# Patient Record
Sex: MALE | Race: WHITE | Employment: FULL TIME | URBAN - METROPOLITAN AREA
[De-identification: names, ages, dates, MRNs, and addresses within clinical notes are randomized per-mention and may not be internally consistent; named-entity substitution may affect disease eponyms.]

---

## 2020-02-27 ENCOUNTER — OFFICE VISIT (OUTPATIENT)
Dept: ORTHOPEDIC SURGERY | Age: 55
End: 2020-02-27
Payer: COMMERCIAL

## 2020-02-27 VITALS
SYSTOLIC BLOOD PRESSURE: 151 MMHG | DIASTOLIC BLOOD PRESSURE: 89 MMHG | BODY MASS INDEX: 44.1 KG/M2 | HEART RATE: 75 BPM | WEIGHT: 315 LBS | HEIGHT: 71 IN

## 2020-02-27 PROCEDURE — 99204 OFFICE O/P NEW MOD 45 MIN: CPT | Performed by: ORTHOPAEDIC SURGERY

## 2020-02-27 RX ORDER — INSULIN GLARGINE 100 [IU]/ML
INJECTION, SOLUTION SUBCUTANEOUS
COMMUNITY
Start: 2020-02-19

## 2020-02-27 RX ORDER — ATORVASTATIN CALCIUM 40 MG/1
TABLET, FILM COATED ORAL
COMMUNITY
Start: 2020-02-06

## 2020-02-27 RX ORDER — PIOGLITAZONEHYDROCHLORIDE 15 MG/1
TABLET ORAL
COMMUNITY
Start: 2020-02-13

## 2020-02-27 RX ORDER — LISINOPRIL 20 MG/1
TABLET ORAL
COMMUNITY
Start: 2019-12-31

## 2020-02-27 RX ORDER — METFORMIN HYDROCHLORIDE 500 MG/1
TABLET, EXTENDED RELEASE ORAL
COMMUNITY
Start: 2020-02-06

## 2020-02-27 RX ORDER — INSULIN DEGLUDEC INJECTION 100 U/ML
INJECTION, SOLUTION SUBCUTANEOUS
COMMUNITY
Start: 2019-11-25

## 2020-02-27 RX ORDER — INSULIN ASPART 100 [IU]/ML
INJECTION, SUSPENSION SUBCUTANEOUS
COMMUNITY
Start: 2019-12-30

## 2020-02-27 ASSESSMENT — ENCOUNTER SYMPTOMS
SORE THROAT: 1
BACK PAIN: 1
SINUS PAIN: 1

## 2020-02-27 NOTE — PROGRESS NOTES
pertinent items are noted in HPI. Review of Systems   HENT: Positive for sinus pain and sore throat. Cardiovascular:        Poor circulation   Musculoskeletal: Positive for back pain and neck pain. Fractures/other injuries  Joint pain - shoulder     All other systems reviewed and are negative. Physical Exam:  BP (!) 151/89   Pulse 75   Ht 5' 11\" (1.803 m)   Wt (!) 320 lb (145.2 kg)   BMI 44.63 kg/m²       General: No acute distress, well nourished  CV: No obvious peripheral edema. Normal peripheral pulses  Neuro: Alert & oriented x 3  Psych: Normal mood and affect    CERVICAL SPINE EXAMINATION:     Inspection:  Local inspection shows no step-off or bruising. Cervical alignment is normal.     Palpation:  No evidence of tenderness. There is mild paraspinal spasm. No midline bony tenderness. Range of Motion: Full ROM including flexion, extension, and lateral bending with some mild discomfort. Strength: 5/5 bilateral upper extremities     Skin: Intact without rashes ulcerations or lesions. Sensation: Intact to light touch throughout both upper extremities. Special Tests: Negative Spurling. No focal motor deficits present throughout the upper extremities. Negative Jorge's sign        LEFT Upper Extremity Exam:      FF Abd ER IR   Active  160 30 T12   Passive ROM   same Same   Strength (x/5)  5/5 Jobes  5/5 Champagne toast 5/5 5/5     Skin:  No skin rashes or lesions, warm, well perfused  Inspection: No deformity,  Palpation: Tender at the [facility] joint. Tender at the biceps tendon anteriorly, non tender at the rotator cuff footprint. Non tender at the posterior joint line. Stability: No instability  Sensation: Intact in all distributions  Motor: AIN/PIN/U 5/5  Strong radial pulse  Special Tests: Negative Hawkin's and Neer's test. Positive Cross body adduction test for pain at the [facility] joint. Negative Denny's test. Negative Crepitus with ROM.  Negative O'[place] Shear test. Equivocal Speed's and Yergason's test.       Contralateral RIGHT Upper Extremity Exam:      FF Abd ER IR   Active  160 40 T12   Passive ROM       Strength (x/5)  5/5 Jobes  5/5 Champagne toast 5/5 5/5     Skin:   No skin rashes or lesions, warm, well perfused  Inspection: No deformity,  Palpation: Non tender at the biceps, nor AC joint, nor rotator cuff footprint  Stability: No instability  Sensation: Intact in all distributions  Motor: AIN/PIN/U 5/5  Strong radial pulse  Special Tests: Negative Hawkin's and Neer's test. Negative Cross body adduction test for pain at the [facility] joint. Negative Denny's test. Negative Crepitus with ROM. Negative [place] Shear test. Negative Speed's and Yergason's test.       Radiographic:  3 xray views of the left  shoulder including True AP in internal and external and axillary lateral were reviewed and interpreted by me today and show no acute fracture, dislocation, loose body, or any major joint space narrowing. MRI Left Shoulder PATIENTS [facility] Ed Jie 2/21/2020)  Impression:  1. Acromioclavicular joint irregularities present, with fluid in the joint and indistinct capsular ligamentous margins. Posttraumatic sprain related and/or degenerative changes are considered with his fluid likely indicative of symptomatic findings. 2.  Negative for full-thickness or high-grade partial-thickness rotator cuff tendon tear. Assessment:  Aydee Fraser is a 62 y.o. male with left superior shoulder pain and clicking symptoms, with an MRI negative for full thickness rotator cuff tear or a surgical lesion. His symptoms are most consistent with 2 main radiologic findings:  1) AC joint sprain and severe osteoarthritis of the AC joint, with substantial aggravation of a pre-existing arthritis problem. 2) Long head of biceps tendinitis proximally in the shoulder    Impression:  Encounter Diagnosis   Name Primary?     Acute pain of left shoulder Yes       Office Procedures:  Orders Placed This Encounter   Procedures    XR SHOULDER LEFT (MIN 2 VIEWS)     Standing Status:   Future     Number of Occurrences:   1     Standing Expiration Date:   2/27/2021       Plan:   Pertinent imaging was reviewed. The etiology, natural history, and treatment options for both of the above disorder were discussed. The roles of activity medication, antiinflammatories, injections, physical therapy, and surgical interventions were all described to the patient and questions were answered. There is no role for surgical intervention at this time. We believe patient is a candidate for a continued conservative treatment program for both his AC joint and his biceps tendinitis. We believe he would benefit from a physical therapy program focused on regaining full flexibility, along with progressive cuff strengthening. A printed referral was given to the patient today. We also would have liked to offer him a shoulder cortisone shot, but he has poorly controlled diabetes with a HbA1c of nearly 12 so we have to postpone this. Our recommendation today regarding his Diabetes is to follow-up with Dr Dominguez Guerrero to more closely monitor his A1C, and to discuss weight loss options and other strategies to have better glycemic control. Mr Sury Gilbert was in completed agreement with the plan outlined above. We would like to see him back in 4-6 weeks for repeat evaluation. Sincerely,    Doug Chester  [facility]   Email: [email or web address]  Cell: [phone]    02/27/20  11:33 AM     The encounter with Destini Cheng was supervised by Dr Keyanna Dixon who personally examined the patient and reviewed the plan. This dictation was performed with a verbal recognition program (DRAGON) and it was checked for errors. It is possible that there are still dictated errors within this office note. If so, please bring any errors to my attention for an addendum.   All

## 2020-02-27 NOTE — PROGRESS NOTES
Review of Systems   HENT: Positive for sinus pain and sore throat. Cardiovascular:        Poor circulation   Musculoskeletal: Positive for back pain and neck pain. Fractures/other injuries  Joint pain - shoulder     All other systems reviewed and are negative.

## 2020-02-27 NOTE — LETTER
[address] [address]  Phone: [phone]  Fax: [phone]    Brian Barahona MD        February 27, 2020     Patient: Ute Muñiz   YOB: 1965   Date of Visit: 2/27/2020       To Whom It May Concern: It is my medical opinion that Ute Muñiz may return to work continue current restrictions. If you have any questions or concerns, please don't hesitate to call.     Sincerely,      Brian Barahona MD

## 2020-02-27 NOTE — LETTER
Physical Therapy Rehabilitation Referral    Patient Name:  Pastor Pierre      YOB: 1965    Diagnosis:    1. Acromioclavicular sprain, left, initial encounter    2. Arthritis of left acromioclavicular joint    3. Biceps tendinitis of left upper extremity        Precautions: No provocative maneuvers to the [facility] [place] joint, such as cross body adduction     [x] Evaluate and Treat for a shoulder flexibility and cuff-strengthening program.    Post Op Instructions:  [] Continuous passive motion (CPM) [] Elbow ROM  [x] Exercise in plane of scapula  []  Strengthening     [] Pulley and instruction   [x] Home exercise program (copy to patient)   [] Sling when arm at risk  [] Sling or brace at all times   [] AAROM: Forward elevation to  140            [] AAROM: External rotation  To  40    [] Isometric external rotator strengthening [] AAROM: internal rotation: up the back  [x] Isometric abductor strengthening  [] AAROM: Internal abduction   [] Isometric internal rotator strengthening [] AAROM: cross-body adduction             Stretching:     Strengthening:  [x] Four quadrant (FE, ER, IR, CBA)  [x] Rotator cuff (ER, IR, Abd)  [] Forward Elevation    [] External Rotators     [] External Rotation    [] Internal Rotators  [] Internal Rotation: up/back   [] Abductors     [] Internal Rotation: supine in abduction  [] Sleeper Stretch    [] Flexors  [] Cross-body abduction    [] Extensors  [] Pendulum (FE, Abd/Add, cw/ccw)  [x] Scapular Stabilizers   [] Wall-walking (FE, Abd)        [x] Shoulder shrugs     [] Table slides (FE)                [x] Rhomboid pinch  [] Elbow (flex, ext, pron, sup)        [] Lat.  Pull downs     [] Medial epicondylitis program       [] Forward punch   [] Lateral epicondylitis program       [] Internal rotators     [] Progressive resistive exercises  [] Bench Press        [] Bench press plus  Activities:     [] Lateral pull-downs  [] Rowing     [] Progressive two-hand supine press [] Stepper/Exercise bike   [] Biceps: curls/supination  [] Swimming  [] Water exercises    Modalities:     Return to Sport:  [x] Of Choice      [] Plyometrics  [] Ultrasound     [] Rhythmic stabilization  [] Iontophoresis    [] Core strengthening   [] Moist heat     [] Sports specific program:   [] Massage         [x] Cryotherapy      [] Electrical stimulation     [] Paraffin  [] Whirlpool  [] TENS    [x] Home exercise program (copy to patient). Perform exercises for:   15     minutes    3      times/day  [x] Supervised physical therapy  Frequency: []  1x week  [x] 2x week  [] 3x week  [] Other:   Duration: [] 2 weeks   [] 4 weeks  [x] 6 weeks  [] Other:     Additional Instructions:       Ariel Paz  [address]   Email: [email or web address]  Cell: [phone]